# Patient Record
Sex: MALE | Race: WHITE | URBAN - METROPOLITAN AREA
[De-identification: names, ages, dates, MRNs, and addresses within clinical notes are randomized per-mention and may not be internally consistent; named-entity substitution may affect disease eponyms.]

---

## 2017-06-07 ENCOUNTER — MYC MEDICAL ADVICE (OUTPATIENT)
Dept: UROLOGY | Facility: CLINIC | Age: 37
End: 2017-06-07

## 2017-06-12 DIAGNOSIS — N46.11 OLIGOASTHENOTERATOSPERMIA: Primary | ICD-10-CM

## 2017-06-12 RX ORDER — CLOMIPHENE CITRATE 50 MG/1
TABLET ORAL
Qty: 30 TABLET | Refills: 3 | Status: SHIPPED | OUTPATIENT
Start: 2017-06-12

## 2018-04-12 ENCOUNTER — PRE VISIT (OUTPATIENT)
Dept: UROLOGY | Facility: CLINIC | Age: 38
End: 2018-04-12

## 2018-04-12 NOTE — TELEPHONE ENCOUNTER
Patient with history of infertility coming in for follow up check medication. Patient chart reviewed, no need for call, all records available and ready for appointment.

## 2018-04-19 ENCOUNTER — APPOINTMENT (OUTPATIENT)
Dept: LAB | Facility: CLINIC | Age: 38
End: 2018-04-19
Payer: COMMERCIAL

## 2018-04-19 ENCOUNTER — OFFICE VISIT (OUTPATIENT)
Dept: UROLOGY | Facility: CLINIC | Age: 38
End: 2018-04-19
Payer: COMMERCIAL

## 2018-04-19 VITALS
HEART RATE: 59 BPM | SYSTOLIC BLOOD PRESSURE: 119 MMHG | DIASTOLIC BLOOD PRESSURE: 78 MMHG | BODY MASS INDEX: 30.31 KG/M2 | WEIGHT: 200 LBS | HEIGHT: 68 IN

## 2018-04-19 DIAGNOSIS — Z31.41 FERTILITY TESTING: Primary | ICD-10-CM

## 2018-04-19 LAB — FSH SERPL-ACNC: 5 IU/L (ref 0.7–10.8)

## 2018-04-19 PROCEDURE — 84270 ASSAY OF SEX HORMONE GLOBUL: CPT | Performed by: UROLOGY

## 2018-04-19 PROCEDURE — 82670 ASSAY OF TOTAL ESTRADIOL: CPT | Performed by: UROLOGY

## 2018-04-19 PROCEDURE — 84403 ASSAY OF TOTAL TESTOSTERONE: CPT | Performed by: UROLOGY

## 2018-04-19 ASSESSMENT — PAIN SCALES - GENERAL: PAINLEVEL: NO PAIN (0)

## 2018-04-19 NOTE — MR AVS SNAPSHOT
"              After Visit Summary   4/19/2018    Praful Browning    MRN: [id number]           Patient Information     Date Of Birth          1980        Visit Information        Provider Department      4/19/2018 3:40 PM Eze Davila MD [facility] and [facility]rolog[facility]        Today's Diagnoses     Fertility testing    -  1       Follow-ups after your visit        Who to contact     Please call your clinic at [phone] to:    Ask questions about your health    Make or cancel appointments    Discuss your medicines    Learn about your test results    Speak to your doctor            Additional Information About Your Visit        MyChart Information     [employer] gives you secure access to your electronic health record. If you see a primary care provider, you can also send messages to your care team and make appointments. If you have questions, please call your primary care clinic.  If you do not have a primary care provider, please call [phone] and they will assist you.      [employer] is an electronic gateway that provides easy, online access to your medical records. With [employer], you can request a clinic appointment, read your test results, renew a prescription or communicate with your care team.     To access your existing account, please contact your [facility] or call [phone] for assistance.        Care EveryWhere ID     This is your Care EveryWhere ID. This could be used by other organizations to access your [facility] medical records  [id number]        Your Vitals Were     Pulse Height BMI (Body Mass Index)             59 1.727 m (5' 8\") 30.41 kg/m2          Blood Pressure from Last 3 Encounters:   04/19/18 119/78   05/19/16 (!) 152/105   11/12/15 (!) 139/98    Weight from Last 3 Encounters:   04/19/18 90.7 kg (200 lb)   05/19/16 90.7 kg (200 lb)   11/12/15 97.2 kg (214 lb 3.2 oz)              We Performed the Following     Estradiol " ultrasensitive     Follicle stimulating hormone     Testosterone Free and Total        Primary Care Provider    None Specified       No primary provider on file.        Equal Access to Services     BERNARDINO GOLDEN : Hadii aad ku hadmarissalovely Montez, staceyradha reynosojumaha, therese triplett enocmalcomradha, tobias spraguedexterdeb aguayo. So [facility] [phone].    ATENCIÓN: Si habla español, tiene a small disposición servicios gratuitos de asistencia lingüística. Llame al [phone].    We comply with applicable federal civil rights laws and [place] laws. We do not discriminate on the basis of race, color, national origin, age, disability, sex, sexual orientation, or gender identity.            Thank you!     Thank you for choosing [facility]OLOGY AND [facility] FOR PROSTATE AND UROLOGIC CANCERS  for your care. Our goal is always to provide you with excellent care. Hearing back from our patients is one way we can continue to improve our services. Please take a few minutes to complete the written survey that you may receive in the mail after your visit with us. Thank you!             Your Updated Medication List - Protect others around you: Learn how to safely use, store and throw away your medicines at www.disposemymeds.org.          This list is accurate as of 4/19/18 11:59 PM.  Always use your most recent med list.                   Brand Name Dispense Instructions for use Diagnosis    ASPIRIN PO      Take 325 mg by mouth    Oligoasthenoteratospermia, Other testicular hypofunction       * clomiPHENE 50 MG tablet    CLOMID    30 tablet    TAKE ONE-HALF TABLET BY MOUTH THREE TIMES A WEEK ON MONDAY, WED AND FRI    Male infertility       * clomiPHENE 50 MG tablet    CLOMID    30 tablet    Take 0.5 tablets (25 mg) by mouth three times a week Take Monday, Wednesday, Friday    Testicular hypofunction       * clomiPHENE 50 MG tablet    CLOMID    30 tablet    Take one half tablet (25 mg) Monday, Wednesday and Friday     Oligoasthenoteratospermia       * Notice:  This list has 3 medication(s) that are the same as other medications prescribed for you. Read the directions carefully, and ask your doctor or other care provider to review them with you.

## 2018-04-19 NOTE — PROGRESS NOTES
"HPI/Subjective: Praful Browning is a 38 year old male who is here for follow up for infertility.   Spouse Lois age 38 ([birth year])    Previous semen analyses as follows:    Semen Analysis 1/17/14: 3ml, pH 8, 23M/cc, 28% motile , 19% morphology (>30%), Total Progressive Motile Count: 19.32 Million.   Semen Analysis for IUI prep 3/13/15: 3ml, pH nr, 12M/cc, 49% motile, IUI Total Progressive Motile Count: 0.5 Million.   Semen Analysis for IUI 5/12/15: 0.6ml (missed some of the specimen), pH nr, 23M/cc, 15% motile, IUI Total Progressive Motile Count: 0.1 Million.     The patient was subsequently started on Clomid in June 2015 and returns for clinical f/u and to discuss progress.  They are not pregnant yet.    Semen Analysis 10/28/2015:  2.5 ml,  pH 8.0, 23M/cc, 52% Motile, 6.0% morphology (>4.5%), Total Motile Count: 57.5 Million.      Semen Analysis [place] 6/8/16:  2.0ml,  pH 8.5, 77.7M/cc, 50% Motile, morphology NR, Total Progressive Motile Count: 77.7  Million.      Objective:  Ht 1.727 m (5' 8\")  Wt 90.7 kg (200 lb)  BMI 30.41 kg/m2   Gen: In NAD.  Resp: Breathing non-labored  CV: Regular rate; extremities warm.     EXAM:  Phallus circumcised, meatus adequate, no plaques palpated.   Left testis descended , size is 16 ccm , consistency is normal . No intra-testicular masses.   Right testis descended , size is 16 ccm , consistency is normal . No intra-testicular masses.   Epididymes present, non-tender, not enlarged.   Left cord: Vas present. no varicocele noted.  Right cord: Vas present. no varicocele noted.     Labs pre Clomid and now:  Component      Latest Ref Rng 5/14/2015 5/19/2016   Hemoglobin      13.3 - 17.7 g/dL  15.9   Hematocrit      40.0 - 53.0 %  44.6   Testosterone Total      240 - 950 ng/dL 412 976 (H)   FSH      0.7 - 10.8 IU/L 4.8 6.9   Estradiol Ultrasensitive      10 - 40 pg/mL 20    Albumin      3.4 - 5.0 g/dL 3.9    Sex Hormone Binding Globulin      11 - 80 nmol/L 34        Assessment & " "Plan: Praful Browning is a 38 year old male being seen in f/u for infertility; previously on Clomid 25mg TIW Since June 2015 here to discuss SA.  Looks like he's had a very large increased in semen parameters on Clomid.      Plan for lab work today per Clomid pathway: testosterone & FSH draw.  I\"ll contact him with results.      15 min visit, over 50% face to face in counseling/discussion of above Clomid management, fertility advice.    Eze Davila MD  Urology Staff   "

## 2018-04-19 NOTE — LETTER
"4/19/2018       RE: Praful Browning  [address]  [place]   [place] [id number]     Dear Colleague,    Thank you for referring your patient, Praful Browning, to the [facility] UROLOGY AND INST FOR PROSTATE AND UROLOGIC CANCERS at [facility]. Please see a copy of my visit note below.    HPI/Subjective: Praful Browning is a 38 year old male who is here for follow up for infertility.    Spouse Lois age 38 ([birth year])    Previous semen analyses as follows:    Semen Analysis 1/17/14: 3ml, pH 8, 23M/cc, 28% motile , 19% morphology (>30%), Total Progressive Motile Count: 19.32 Million.   Semen Analysis for IUI prep 3/13/15: 3ml, pH nr, 12M/cc, 49% motile, IUI Total Progressive Motile Count: 0.5 Million.   Semen Analysis for IUI 5/12/15: 0.6ml (missed some of the specimen), pH nr, 23M/cc, 15% motile, IUI Total Progressive Motile Count: 0.1 Million.     The patient was subsequently started on Clomid in June 2015 and returns for clinical f/u and to discuss progress.  They are not pregnant yet.    Semen Analysis 10/28/2015:  2.5 ml,  pH 8.0, 23M/cc, 52% Motile, 6.0% morphology (>4.5%), Total Motile Count: 57.5 Million.      Semen Analysis [place] 6/8/16:  2.0ml,  pH 8.5, 77.7M/cc, 50% Motile, morphology NR, Total Progressive Motile Count: 77.7  Million.      Objective:  Ht 1.727 m (5' 8\")  Wt 90.7 kg (200 lb)  BMI 30.41 kg/m2   Gen: In NAD.  Resp: Breathing non-labored  CV: Regular rate; extremities warm.     EXAM:  Phallus circumcised, meatus adequate, no plaques palpated.   Left testis descended , size is 16 ccm , consistency is normal . No intra-testicular masses.   Right testis descended , size is 16 ccm , consistency is normal . No intra-testicular masses.   Epididymes present, non-tender, not enlarged.   Left cord: Vas present. no varicocele noted.  Right cord: Vas present. no varicocele noted.     Labs pre Clomid and now:  Component      Latest Ref Rng 5/14/2015 " "5/19/2016   Hemoglobin      13.3 - 17.7 g/dL  15.9   Hematocrit      40.0 - 53.0 %  44.6   Testosterone Total      240 - 950 ng/dL 412 976 (H)   FSH      0.7 - 10.8 IU/L 4.8 6.9   Estradiol Ultrasensitive      10 - 40 pg/mL 20    Albumin      3.4 - 5.0 g/dL 3.9    Sex Hormone Binding Globulin      11 - 80 nmol/L 34        Assessment & Plan: Praful Browning is a 38 year old male being seen in f/u for infertility; previously on Clomid 25mg TIW Since June 2015 here to discuss SA.  Looks like he's had a very large increased in semen parameters on Clomid.      Plan for lab work today per Clomid pathway: testosterone & FSH draw.  I\"ll contact him with results.      15 min visit, over 50% face to face in counseling/discussion of above Clomid management, fertility advice.    Please notify pt of these results:    Testosterone looks low off Clomid.  Recommend start Clomid 25mg Monday, Wednesday, Friday and follow-up per protocol please    - thanks.  JM         Again, thank you for allowing me to participate in the care of your patient.      Sincerely,    Eze Davila MD      "

## 2018-04-19 NOTE — NURSING NOTE
Chief Complaint   Patient presents with     RECHECK     history of infertility coming in for follow up check medication     Gema Arvizu

## 2018-04-21 LAB
SHBG SERPL-SCNC: 51 NMOL/L (ref 11–80)
TESTOST FREE SERPL-MCNC: 3.38 NG/DL (ref 4.7–24.4)
TESTOST SERPL-MCNC: 234 NG/DL (ref 240–950)

## 2018-04-24 LAB — ESTRADIOL SERPL HS-MCNC: 20 PG/ML (ref 10–40)

## 2018-05-01 NOTE — PROGRESS NOTES
Please notify pt of these results:    Testosterone looks low off Clomid.  Recommend start Clomid 25mg Monday, Wednesday, Friday and follow-up per protocol please    - thanks.  JM

## 2018-05-14 DIAGNOSIS — E29.1 TESTICULAR HYPOFUNCTION: Primary | ICD-10-CM

## 2018-05-14 RX ORDER — CLOMIPHENE CITRATE 50 MG/1
TABLET ORAL
Qty: 12 TABLET | Refills: 6 | Status: SHIPPED | OUTPATIENT
Start: 2018-05-14 | End: 2018-07-31

## 2018-05-15 ENCOUNTER — TELEPHONE (OUTPATIENT)
Dept: UROLOGY | Facility: CLINIC | Age: 38
End: 2018-05-15

## 2018-05-15 NOTE — TELEPHONE ENCOUNTER
Prior Authorization Retail Medication Request    Medication/Dose: clomiphene citrate 50 mg  ICD code (if different than what is on RX):  N46.9  Previously Tried and Failed:  Only option  Rationale:  Low blood tests    Insurance Name:  Blue Plus  Insurance ID:  [id number]      Pharmacy Information (if different than what is on RX)  [facility]:  [facility]  Phone:  [phone]

## 2018-05-21 ENCOUNTER — TELEPHONE (OUTPATIENT)
Dept: UROLOGY | Facility: CLINIC | Age: 38
End: 2018-05-21

## 2018-05-21 NOTE — TELEPHONE ENCOUNTER
Prior Authorization Retail Medication Request    Medication/Dose: clomiphene citrate 50 mg  ICD code (if different than what is on RX):  N46.9  Previously Tried and Failed:  No other meds  Rationale:  infertility    Insurance Name:  Blue plus  Insurance ID:  [id number]      Pharmacy Information (if different than what is on RX)  [facility]  Phone:  [phone]

## 2018-05-21 NOTE — TELEPHONE ENCOUNTER
Central Prior Authorization Team   Phone: [phone]      PA Initiation    Medication: clomiPHENE (CLOMID) 50 MG tablet  Insurance Company: [employer] Plus P[employer] - Phone [phone] Fax [phone]  Pharmacy Filling the Rx: [id number] [place] - [address]  Filling Pharmacy Phone: [phone]  Filling Pharmacy Fax: [phone]  Start Date: 5/21/2018

## 2018-05-22 NOTE — TELEPHONE ENCOUNTER
PRIOR AUTHORIZATION DENIED    Medication: clomiPHENE (CLOMID) 50 MG tablet- P/A DENIED    Denial Date: 5/22/2018    Denial Rational: Diagnosis is not covered on patient's plan.           Appeal Information:

## 2018-07-30 DIAGNOSIS — E29.1 TESTICULAR HYPOFUNCTION: ICD-10-CM

## 2018-07-31 DIAGNOSIS — E29.1 TESTICULAR HYPOFUNCTION: ICD-10-CM

## 2018-07-31 RX ORDER — CLOMIPHENE CITRATE 50 MG/1
TABLET ORAL
Qty: 30 TABLET | Refills: 0 | OUTPATIENT
Start: 2018-07-31

## 2018-08-02 RX ORDER — CLOMIPHENE CITRATE 50 MG/1
TABLET ORAL
Qty: 12 TABLET | Refills: 5 | Status: SHIPPED | OUTPATIENT
Start: 2018-08-02

## 2018-08-06 ENCOUNTER — TELEPHONE (OUTPATIENT)
Dept: UROLOGY | Facility: CLINIC | Age: 38
End: 2018-08-06

## 2018-08-06 NOTE — TELEPHONE ENCOUNTER
Prior Authorization Retail Medication Request    Medication/Dose: clomiphene citrate 50 mg  ICD code (if different than what is on RX):  N46.9  Previously Tried and Failed:  no  Rationale:      Insurance Name:  Blue plus  Insurance ID:  [id number]      Pharmacy Information (if different than what is on RX)  Name:  [facility]  Phone:  [phone]

## 2018-08-07 NOTE — TELEPHONE ENCOUNTER
Central Prior Authorization Team   Phone: [phone]      PA Initiation    Medication: clomiphene citrate 50 mg-PA Initiated  Insurance Company: [employer] - Phone [phone] Fax [phone]  Pharmacy Filling the Rx: [facility] [address] [place] [address] [address]  Filling Pharmacy Phone: [phone]  Filling Pharmacy Fax: [phone]  Start Date: 8/7/2018

## 2018-08-08 NOTE — TELEPHONE ENCOUNTER
PRIOR AUTHORIZATION DENIED    Medication: clomiphene citrate 50 mg-DENIED    Denial Date: 8/8/2018    Denial Rational:           Appeal Information:

## 2018-12-03 ENCOUNTER — TRANSFERRED RECORDS (OUTPATIENT)
Dept: HEALTH INFORMATION MANAGEMENT | Facility: CLINIC | Age: 38
End: 2018-12-03

## 2019-04-29 ENCOUNTER — TELEPHONE (OUTPATIENT)
Dept: UROLOGY | Facility: CLINIC | Age: 39
End: 2019-04-29

## 2019-04-29 NOTE — TELEPHONE ENCOUNTER
[facility] Call Center    Phone Message    May a detailed message be left on voicemail: yes    Reason for Call: Order(s): Referral to be seen for premature ejaculation    Reason requested: Guidelines indicate to [facility], however patient would like recommendation for providers closer to home in [place], [place].  Apparently,  is major health provider out there.  Date needed: asap  Provider name: Dr. Davila      Action Taken: Message routed to:  [facility] urology

## 2019-04-30 NOTE — TELEPHONE ENCOUNTER
Patient called and told no referrals or names that we know of  Call insurance. Kelly Flores, [facility] Staff Nurse

## 2020-03-10 ENCOUNTER — HEALTH MAINTENANCE LETTER (OUTPATIENT)
Age: 40
End: 2020-03-10

## 2020-12-27 ENCOUNTER — HEALTH MAINTENANCE LETTER (OUTPATIENT)
Age: 40
End: 2020-12-27

## 2021-04-24 ENCOUNTER — HEALTH MAINTENANCE LETTER (OUTPATIENT)
Age: 41
End: 2021-04-24

## 2021-10-03 ENCOUNTER — HEALTH MAINTENANCE LETTER (OUTPATIENT)
Age: 41
End: 2021-10-03

## 2022-05-15 ENCOUNTER — HEALTH MAINTENANCE LETTER (OUTPATIENT)
Age: 42
End: 2022-05-15

## 2022-09-11 ENCOUNTER — HEALTH MAINTENANCE LETTER (OUTPATIENT)
Age: 42
End: 2022-09-11

## 2023-06-03 ENCOUNTER — HEALTH MAINTENANCE LETTER (OUTPATIENT)
Age: 43
End: 2023-06-03